# Patient Record
Sex: FEMALE | Race: WHITE | NOT HISPANIC OR LATINO | URBAN - METROPOLITAN AREA
[De-identification: names, ages, dates, MRNs, and addresses within clinical notes are randomized per-mention and may not be internally consistent; named-entity substitution may affect disease eponyms.]

---

## 2018-02-01 ENCOUNTER — OFFICE (OUTPATIENT)
Dept: URBAN - METROPOLITAN AREA CLINIC 14 | Facility: CLINIC | Age: 23
End: 2018-02-01

## 2018-02-01 VITALS
WEIGHT: 145 LBS | DIASTOLIC BLOOD PRESSURE: 89 MMHG | SYSTOLIC BLOOD PRESSURE: 124 MMHG | RESPIRATION RATE: 16 BRPM | HEART RATE: 95 BPM

## 2018-02-01 DIAGNOSIS — K52.9 NONINFECTIVE GASTROENTERITIS AND COLITIS, UNSPECIFIED: ICD-10-CM

## 2018-02-01 DIAGNOSIS — K21.9 GASTRO-ESOPHAGEAL REFLUX DISEASE WITHOUT ESOPHAGITIS: ICD-10-CM

## 2018-02-01 PROCEDURE — 99203 OFFICE O/P NEW LOW 30 MIN: CPT | Performed by: INTERNAL MEDICINE

## 2018-02-01 NOTE — SERVICENOTES
It appears that she has some baseline mild intermittent reflux and indigestion symptoms but her most recent episode appears to be more consistent with acute viral gastroenteritis that was self-limited and has now resolved.  At this point since she is doing better I will hold off on any further evaluation, however we will try to obtain records from her most recent ultrasound last year as well as the blood work.  She was told to notify us if her symptoms do return at which time we can consider further evaluation such as evaluation for H pylori, gastritis, stool studies, or even endoscopic evaluation if needed.  In the meantime I did recommend that she take a one month course of Prilosec and then may take an H2 blocker on an as-needed basis after this.  She states that she still has the antispasmodic given to her last year so she may take this on an as-needed basis as well but to notify us if she starts having symptoms again.

## 2018-02-01 NOTE — SERVICEHPINOTES
Ms. Escobedo is a 22-year-old female here for evaluation after acute onset of abdominal symptoms. She states that she was in her normal state of health up until this past Friday morning when she awoke with some nausea and did have some vomiting associated with some upper abdominal pain. Her symptoms then worsened on Saturday. She states that she also felt feverish and also was having some diarrhea. She did better on Sunday and her symptoms have eventually resolved back to normal where she has no symptoms at this time. She admits that she does have some occasional reflux symptoms and the reflux was worse over the weekend. She does not take anything for this on a regular basis. It is usually worse whenever she lays down after eating or eats spicy food. She denies any sick contacts that she is aware of. She also denies eating any raw or undercooked food but did have a frosted coffee the evening before her symptoms. She denies any rectal bleeding. She states that she also had an episode last September where she had acute onset crampy epigastric pain that would come and go in waves. It was also associated with reflux. She went to [facility] where abdominal ultrasound was negative and was placed on a PPI. She was also given an antispasmodic but never had to use it. There is no first-degree family history of colon cancer or colon polyps but there is a family history of reflux.